# Patient Record
Sex: FEMALE | Race: WHITE | NOT HISPANIC OR LATINO | Employment: FULL TIME | URBAN - METROPOLITAN AREA
[De-identification: names, ages, dates, MRNs, and addresses within clinical notes are randomized per-mention and may not be internally consistent; named-entity substitution may affect disease eponyms.]

---

## 2018-01-25 ENCOUNTER — OFFICE VISIT (OUTPATIENT)
Dept: URGENT CARE | Facility: CLINIC | Age: 48
End: 2018-01-25
Payer: COMMERCIAL

## 2018-01-25 VITALS
TEMPERATURE: 97.9 F | WEIGHT: 242 LBS | HEIGHT: 68 IN | SYSTOLIC BLOOD PRESSURE: 118 MMHG | BODY MASS INDEX: 36.68 KG/M2 | HEART RATE: 88 BPM | DIASTOLIC BLOOD PRESSURE: 76 MMHG | OXYGEN SATURATION: 98 % | RESPIRATION RATE: 16 BRPM

## 2018-01-25 DIAGNOSIS — L30.1 DYSHIDROTIC ECZEMA: ICD-10-CM

## 2018-01-25 PROCEDURE — 99214 OFFICE O/P EST MOD 30 MIN: CPT | Performed by: PHYSICIAN ASSISTANT

## 2018-01-25 RX ORDER — METHYLPREDNISOLONE 4 MG/1
TABLET ORAL
Qty: 21 TAB | Refills: 0 | Status: SHIPPED | OUTPATIENT
Start: 2018-01-25 | End: 2018-11-16

## 2018-01-26 NOTE — PROGRESS NOTES
Chief Complaint   Patient presents with   • Rash       HISTORY OF PRESENT ILLNESS: Patient is a 47 y.o. female who presents today for the following:    Rash on feet x 2 weeks  Worsening  Severe itching; pain with ambulation  OTC steroid cream is not helping  No h/o the same     There are no active problems to display for this patient.      Allergies:E-mycin [erythromycin] and Pcn [penicillins]    Current Outpatient Prescriptions Ordered in [facility]   Medication Sig Dispense Refill   • MethylPREDNISolone (MEDROL DOSEPAK) 4 MG Tablet Therapy Pack Use as package directs 21 Tab 0   • doxycycline (VIBRAMYCIN) 100 MG Cap Take 1 Cap by mouth 2 times a day. 20 Cap 0   • promethazine-codeine (PHENERGAN-CODEINE) 6.25-10 MG/5ML Syrup Take 5 mL by mouth 4 times a day as needed for Cough. 120 mL 0   • albuterol (VENTOLIN OR PROVENTIL) 108 (90 BASE) MCG/ACT Aero Soln inhalation aerosol Inhale 2 Puffs by mouth every four hours as needed for Shortness of Breath. 1 Inhaler 1     No current Epic-ordered facility-administered medications on file.        Past Medical History:   Diagnosis Date   • Arthritis        Social History   Substance Use Topics   • Smoking status: Never Smoker   • Smokeless tobacco: Never Used   • Alcohol use No       Family Status   Relation Status   • Maternal Aunt      Family History   Problem Relation Age of Onset   • Cancer Maternal Aunt      breast       ROS:    Review of Systems   Constitutional: Negative for fever, chills, weight loss and malaise/fatigue.   HENT: Negative for ear pain, nosebleeds, congestion, sore throat and neck pain.    Eyes: Negative for blurred vision.   Respiratory: Negative for cough, sputum production, shortness of breath and wheezing.    Cardiovascular: Negative for chest pain, palpitations, orthopnea and leg swelling.   Gastrointestinal: Negative for heartburn, nausea, vomiting and abdominal pain.   Genitourinary: Negative for dysuria, urgency and frequency.       Exam:  Blood  "pressure 118/76, pulse 88, temperature 36.6 °C (97.9 °F), resp. rate 16, height 1.727 m (5' 8\"), weight 109.8 kg (242 lb), SpO2 98 %.  General: Well developed, well nourished. No distress.  HEENT: Head is grossly normal.  Pulmonary: No respiratory distress noted.  Cardiovascular: Pedal pulses are strong and equal bilaterally.  Skin: Scatter intradermal vesicular lesions noted on the soles of both feet; right is significantly worse than the left. Right sole with 3 separate patches of confluent vesicular lesions. NTTP and without drainage. No erythema or warmth.  Psych: Normal mood. Alert and oriented x3. Judgment and insight is normal.    Assessment/Plan:  Discussed dx and provided handout with information. Take all medication as directed. Discussed wound care at home. Follow up for any signs of infection as discussed in clinic.   1. Dyshidrotic eczema  MethylPREDNISolone (MEDROL DOSEPAK) 4 MG Tablet Therapy Pack       "

## 2018-01-26 NOTE — PATIENT INSTRUCTIONS
Hand Dermatitis  Hand dermatitis (dyshidrotic eczema) is a skin condition in which small, itchy, raised dots or fluid-filled blisters form over the palms of the hands. Outbreaks of hand dermatitis can last 3 to 4 weeks.  CAUSES   The cause of hand dermatitis is unknown. However, it occurs most often in patients with a history of allergies such as:  · Hay fever.  · Allergic asthma.  · Allergies to latex.  Chemical exposure, injuries, and environmental irritants can make hand dermatitis worse. Washing your hands too frequently can remove natural oils, which can dry out the skin and contribute to outbreaks of hand dermatitis.  SYMPTOMS   The most common symptom of hand dermatitis is intense itching. Cracks or grooves (fissures) on the fingers can also develop. Affected areas can be painful, especially areas where large blisters have formed.  DIAGNOSIS  Your caregiver can usually tell what the problem is by doing a physical exam.  PREVENTION  · Avoid excessive hand washing.  · Avoid the use of harsh chemicals.  · Wear protective gloves when handling products that can irritate your skin.  TREATMENT   Steroid creams and ointments, such as over-the-counter 1% hydrocortisone cream, can reduce inflammation and improve moisture retention. These should be applied at least 2 to 4 times per day. Your caregiver may ask you to use a stronger prescription steroid cream to help speed the healing of blistered and cracked skin. In severe cases, oral steroid medicine may be needed. If you have an infection, antibiotics may be needed. Your caregiver may also prescribe antihistamines. These medicines help reduce itching.  HOME CARE INSTRUCTIONS  · Only take over-the-counter or prescription medicines as directed by your caregiver.  · You may use wet or cold compresses. This can help:  ¨ Alleviate itching.  ¨ Increase the effectiveness of topical creams.  ¨ Minimize blisters.  SEEK MEDICAL CARE IF:  · The rash is not better after 1 week of  treatment.  · Signs of infection develop, such as redness, tenderness, or yellowish-white fluid (pus).  · The rash is spreading.     This information is not intended to replace advice given to you by your health care provider. Make sure you discuss any questions you have with your health care provider.     Document Released: 12/18/2006 Document Revised: 03/11/2013 Document Reviewed: 05/16/2012  [employer] Patient Education ©2016 [employer]

## 2018-11-10 ENCOUNTER — OFFICE VISIT (OUTPATIENT)
Dept: URGENT CARE | Facility: PHYSICIAN GROUP | Age: 48
End: 2018-11-10
Payer: COMMERCIAL

## 2018-11-10 ENCOUNTER — HOSPITAL ENCOUNTER (OUTPATIENT)
Dept: RADIOLOGY | Facility: MEDICAL CENTER | Age: 48
End: 2018-11-10
Attending: FAMILY MEDICINE
Payer: COMMERCIAL

## 2018-11-10 VITALS
BODY MASS INDEX: 35.61 KG/M2 | TEMPERATURE: 98.5 F | HEART RATE: 79 BPM | OXYGEN SATURATION: 96 % | SYSTOLIC BLOOD PRESSURE: 122 MMHG | RESPIRATION RATE: 14 BRPM | WEIGHT: 235 LBS | HEIGHT: 68 IN | DIASTOLIC BLOOD PRESSURE: 80 MMHG

## 2018-11-10 DIAGNOSIS — M79.672 LEFT FOOT PAIN: ICD-10-CM

## 2018-11-10 PROCEDURE — 73630 X-RAY EXAM OF FOOT: CPT | Mod: LT

## 2018-11-10 PROCEDURE — 99214 OFFICE O/P EST MOD 30 MIN: CPT | Performed by: INTERNAL MEDICINE

## 2018-11-10 RX ORDER — NAPROXEN 250 MG/1
250 TABLET ORAL 2 TIMES DAILY WITH MEALS
COMMUNITY
End: 2021-01-29

## 2018-11-10 ASSESSMENT — ENCOUNTER SYMPTOMS
CONSTITUTIONAL NEGATIVE: 1
NEUROLOGICAL NEGATIVE: 1

## 2018-11-10 NOTE — PROGRESS NOTES
"Subjective:      Monalisa Rodriguez is a 48 y.o. female who presents with Foot Pain (x monday notice a knot on L foot and two days after pain started occuring// x 4 months heel pain  )            This is a new problem. Pian in the left lateral aspect of the foot for the past, no injures, no paresthesias, naproxen helps a bit,  no hx of gout.  Pain is almost constant, worse with walking.  She denies any fever or chills.  Hx of fx of 5th metatarsal in the past when she was 9 yo.          Review of Systems   Constitutional: Negative.    Neurological: Negative.           Objective:     /80 (BP Location: Left arm, Patient Position: Sitting)   Pulse 79   Temp 36.9 °C (98.5 °F) (Temporal)   Resp 14   Ht 1.727 m (5' 8\")   Wt 106.6 kg (235 lb)   SpO2 96%   BMI 35.73 kg/m²      Physical Exam   Constitutional: She is oriented to person, place, and time. She appears well-nourished. No distress.   Cardiovascular: Normal rate.    Pulmonary/Chest: Effort normal. No respiratory distress.   Musculoskeletal:        Left ankle: Normal.        Left foot: There is tenderness (over the outlined region with some swelling but no erythema noted), bony tenderness and swelling. There is normal range of motion, normal capillary refill, no crepitus and no laceration.        Feet:    Neurological: She is alert and oriented to person, place, and time.   Skin: Skin is warm. No rash noted. She is not diaphoretic. No pallor.     Xray left foot:  Negative  For acute abnormalities         ASSESSMENT:PLAN:  1. Left foot pain  - DX-FOOT-COMPLETE 3+ LEFT; Future  - Vasopneumatic Foot Boots    No fracture seen on x-ray and discussed  No signs of infection  We also discussed and reassured that this would likely not be blood clot  Trial of over-the-counter medication, icing frequently and boot  Plan per orders and instructions  Warning signs reviewed  Case and results reviewed and agree with treatment plan as outlined.  Dr. Becerra    "

## 2018-11-16 ENCOUNTER — HOSPITAL ENCOUNTER (EMERGENCY)
Facility: MEDICAL CENTER | Age: 48
End: 2018-11-17
Attending: EMERGENCY MEDICINE
Payer: COMMERCIAL

## 2018-11-16 DIAGNOSIS — M79.672 LEFT FOOT PAIN: ICD-10-CM

## 2018-11-16 PROCEDURE — 99284 EMERGENCY DEPT VISIT MOD MDM: CPT

## 2018-11-16 ASSESSMENT — PAIN DESCRIPTION - DESCRIPTORS: DESCRIPTORS: SHARP

## 2018-11-17 ENCOUNTER — APPOINTMENT (OUTPATIENT)
Dept: RADIOLOGY | Facility: MEDICAL CENTER | Age: 48
End: 2018-11-17
Attending: EMERGENCY MEDICINE
Payer: COMMERCIAL

## 2018-11-17 VITALS
SYSTOLIC BLOOD PRESSURE: 152 MMHG | OXYGEN SATURATION: 98 % | WEIGHT: 235 LBS | RESPIRATION RATE: 18 BRPM | HEART RATE: 84 BPM | TEMPERATURE: 98 F | DIASTOLIC BLOOD PRESSURE: 101 MMHG | BODY MASS INDEX: 35.73 KG/M2

## 2018-11-17 PROCEDURE — 73700 CT LOWER EXTREMITY W/O DYE: CPT | Mod: LT

## 2018-11-17 PROCEDURE — A9270 NON-COVERED ITEM OR SERVICE: HCPCS | Performed by: EMERGENCY MEDICINE

## 2018-11-17 PROCEDURE — 700102 HCHG RX REV CODE 250 W/ 637 OVERRIDE(OP): Performed by: EMERGENCY MEDICINE

## 2018-11-17 RX ORDER — IBUPROFEN 600 MG/1
600 TABLET ORAL ONCE
Status: COMPLETED | OUTPATIENT
Start: 2018-11-17 | End: 2018-11-17

## 2018-11-17 RX ORDER — KETOROLAC TROMETHAMINE 30 MG/ML
30 INJECTION, SOLUTION INTRAMUSCULAR; INTRAVENOUS ONCE
Status: DISCONTINUED | OUTPATIENT
Start: 2018-11-17 | End: 2018-11-17

## 2018-11-17 RX ORDER — NAPROXEN 500 MG/1
500 TABLET ORAL 2 TIMES DAILY WITH MEALS
Qty: 60 TAB | Refills: 0 | Status: SHIPPED | OUTPATIENT
Start: 2018-11-17 | End: 2021-01-29

## 2018-11-17 RX ADMIN — IBUPROFEN 600 MG: 600 TABLET, FILM COATED ORAL at 01:51

## 2018-11-17 NOTE — ED PROVIDER NOTES
"ED Provider Note    Scribed for Federica Dobson M.D. by Lucy Espinoza. 11/17/2018  12:14 AM    Means of arrival: walk in  History obtained from: patient  History limited by: none      CHIEF COMPLAINT  Chief Complaint   Patient presents with   • Foot Pain     Pt reports she cannot bear weight without pain, for the last few weeks the pain has increased. No obvious swelling or deformity.        HPI  Monalisaamor Rodriguez is a 48 y.o. Female with history of arthritis who presents to the Emergency Department for evaluation of left foot pain onset a few weeks ago with increasing severity tonight. She states that she can not bear any weight on her foot. She explains that there is a visible, painful \"bump\" on the side of her left foot. Patient denies radiation of the pain up her leg. She denies any swelling or redness of her foot. Patient additionally denies associated lower extremity pain or swelling. She went to urgent care and was sent home with a walking boot. Patient denies any recent traumas or past surgeries on her foot. Patient normally takes naproxen for her history of arthritis but has not taken any medications for her foot pain.    REVIEW OF SYSTEMS  Pertinent positive include foot pain. Pertinent negative include foot swelling, leg swelling, warmth.      PAST MEDICAL HISTORY   has a past medical history of Arthritis.    SOCIAL HISTORY  Social History     Social History Main Topics   • Smoking status: Never Smoker   • Smokeless tobacco: Never Used   • Alcohol use No   • Drug use: No            SURGICAL HISTORY   has a past surgical history that includes appendectomy; tonsillectomy; and other orthopedic surgery.    CURRENT MEDICATIONS  Home Medications     Reviewed by Courtney Dobbs R.N. (Registered Nurse) on 11/16/18 at 2240  Med List Status: Not Addressed   Medication Last Dose Status   naproxen (NAPROSYN) 250 MG Tab PRN Active                ALLERGIES  Allergies   Allergen Reactions   • E-Mycin " [Erythromycin]      Stomach ache     • Pcn [Penicillins] Vomiting       PHYSICAL EXAM   VITAL SIGNS: /101   Pulse 82   Temp 36.7 °C (98 °F) (Temporal)   Resp 18   Wt 106.6 kg (235 lb)   SpO2 97%   BMI 35.73 kg/m²    Constitutional: Well appearing middle aged woman, Alert in no apparent distress.  HENT: Normocephalic, Atraumatic. Bilateral external ears normal. Nose normal.  Moist mucous membranes.  Oropharynx clear.  Eyes: Pupils are equal and reactive. Conjunctiva normal.   Neck: Supple, full range of motion  Musculoskeletal: Atraumatic. No obvious deformities noted.  No lower extremity edema. Left foot is atraumatic, no overlying erythema or warmth, full range of motion of left ankle joint without significant pain, tenderness overlying plantar aspect of foot, intact distal pulses  Skin: Warm, Dry.  No erythema, No rash.   Neurologic: Alert and oriented x3. Moving all extremities spontaneously without focal deficits.  Psychiatric: Affect normal, Mood normal, Appears appropriate and not intoxicated.      DIAGNOSTIC STUDIES      RADIOLOGY  Personally reviewed by me  CT-FOOT W/O PLUS RECONS LEFT   Final Result      No bony abnormality.          ED COURSE  Vitals:    11/17/18 0030 11/17/18 0100 11/17/18 0130 11/17/18 0208   BP:       Pulse: 74 77 86 84   Resp:       Temp:       TempSrc:       SpO2: 98% 100% 98% 98%   Weight:             Medications administered:  Medications   ibuprofen (MOTRIN) tablet 600 mg (600 mg Oral Given 11/17/18 0151)         Old records personally reviewed:  Patient was seen at urgent care for similar complaints. She had an X-ray on 11/10 that demonstrated a calcaneal spur but no other acute findings.    12:14 AM Patient seen and examined at bedside. The patient presents with foot pain onset a few weeks ago. Ordered for CT-foot to evaluate. Patient will be treated with 600mg of ibuprofen for her symptoms. Explained that exam is not concerning for  DVT, gout, or infection of her  foot at this time. Informed that her symptoms may be consistent with plantar fascitis or tendinitis.  Explained that she will need to stay off of her feet if her symptoms are tendon related.       MEDICAL DECISION MAKING  Patient presents with 3 week history of atraumatic left foot pain.  She is mildly hypertensive with otherwise normal vitals and well appearing.  Foot exam appears normal without e/o swelling, overlying erythema or warmth.  No clinical signs of DVT, septic arthritis, or gout.  Reviewed recent XR with e/o calcaneal bone spur however otherwise normal.  Pain appears to localized to midfoot and plantar aspect therefore suspect tendonitis or plantar fasciitis.  CT of the foot was completed without e/o occult fracture or significant joint effusion.  Discussed importance of rest without significant weight bearing in addition to daily antiinflammatories.   Plan for follow up with PCP or foot/ankle specialist.  She understands return precautions for changing or worsening symptoms.      DISPOSITION:  Patient will be discharged home in stable condition.    FOLLOW UP:  Mathew Santiago M.D.  [address] [facility] [id number] [phone]      FOOT/ANKLE FOLLOW UP    [facility], Emergency Dept  [facility] [facility] [id number] [phone]    If symptoms worsen      OUTPATIENT MEDICATIONS:  Discharge Medication List as of 11/17/2018  2:34 AM      START taking these medications    Details   !! naproxen (NAPROSYN) 500 MG Tab Take 1 Tab by mouth 2 times a day, with meals., Disp-60 Tab, R-0, Print Rx Paper       !! - Potential duplicate medications found. Please discuss with provider.            IMPRESSION  (M79.672) Left foot pain    Results, diagnoses, and treatment options were discussed with the patient and/or family. Patient verbalized understanding of plan of care.       Lcuy MALIK (Scribe), am scribing for, and in the presence of, Federica Dobson,  M.D..    Electronically signed by: Lucy Espinoza (Scribe), 11/17/2018    IFederica M.D. personally performed the services described in this documentation, as scribed by Lucy Espinoza in my presence, and it is both accurate and complete. E    The note accurately reflects work and decisions made by me.  Federica Dobson  11/17/2018  2:59 PM

## 2018-11-17 NOTE — ED TRIAGE NOTES
Monalisa Patrick Michael  48 y.o. female  Chief Complaint   Patient presents with   • Foot Pain     Pt reports she cannot bear weight without pain, for the last few weeks the pain has increased. No obvious swelling or deformity.        Pt to triage via WC. Pt is alert and oriented, speaking in full sentences, follows commands and responds appropriately to questions. NAD. Resp are even and unlabored.  Pt placed in lobby. Pt educated on triage process. Pt encouraged to alert staff for any changes.

## 2018-11-17 NOTE — DISCHARGE INSTRUCTIONS
You were seen in the Emergency Department for foot pain.    CT foot were completed without significant acute abnormalities.    Please use naproxen twice daily for the next 1-2 weeks.  Stay off your foot, elevate and ice as much as possible.    Please follow up with your primary care physician or foot/ankle specialist in the next week.    Return to the Emergency Department with worsening pain, numbness, weakness, or other concerns.

## 2018-11-26 ENCOUNTER — OFFICE VISIT (OUTPATIENT)
Dept: URGENT CARE | Facility: PHYSICIAN GROUP | Age: 48
End: 2018-11-26
Payer: COMMERCIAL

## 2018-11-26 VITALS
HEART RATE: 78 BPM | RESPIRATION RATE: 20 BRPM | DIASTOLIC BLOOD PRESSURE: 82 MMHG | SYSTOLIC BLOOD PRESSURE: 132 MMHG | OXYGEN SATURATION: 98 % | TEMPERATURE: 96.6 F

## 2018-11-26 DIAGNOSIS — J02.9 SORE THROAT: ICD-10-CM

## 2018-11-26 DIAGNOSIS — J06.9 UPPER RESPIRATORY TRACT INFECTION, UNSPECIFIED TYPE: Primary | ICD-10-CM

## 2018-11-26 LAB
INT CON NEG: NEGATIVE
INT CON POS: POSITIVE
S PYO AG THROAT QL: NEGATIVE

## 2018-11-26 PROCEDURE — 87880 STREP A ASSAY W/OPTIC: CPT | Performed by: NURSE PRACTITIONER

## 2018-11-26 PROCEDURE — 99214 OFFICE O/P EST MOD 30 MIN: CPT | Performed by: NURSE PRACTITIONER

## 2018-11-26 RX ORDER — DOXYCYCLINE HYCLATE 100 MG
100 TABLET ORAL 2 TIMES DAILY
Qty: 20 TAB | Refills: 0 | Status: SHIPPED | OUTPATIENT
Start: 2018-11-26 | End: 2018-12-06

## 2018-11-26 ASSESSMENT — ENCOUNTER SYMPTOMS
NEUROLOGICAL NEGATIVE: 1
GASTROINTESTINAL NEGATIVE: 1
PSYCHIATRIC NEGATIVE: 1
HEADACHES: 0
ABDOMINAL PAIN: 0
SINUS PAIN: 0
SORE THROAT: 1
CARDIOVASCULAR NEGATIVE: 1
BLURRED VISION: 0
EYES NEGATIVE: 1
COUGH: 1
SHORTNESS OF BREATH: 0
CHILLS: 1
MUSCULOSKELETAL NEGATIVE: 1

## 2018-11-27 NOTE — PROGRESS NOTES
Subjective:   Monalisa Rodriguez is a 48 y.o. female who presents for Pharyngitis (x4 days, cough, hard to breath when walking at long intervals)     Pharyngitis    This is a new problem. Episode onset: 4 days ago. The problem has been gradually worsening. Maximum temperature: reports feeling chills. Associated symptoms include congestion and coughing. Pertinent negatives include no abdominal pain, ear pain, headaches or shortness of breath. Associated symptoms comments: +fatigue/malaise. Treatments tried: Eli Myles.     PMH:  has a past medical history of Arthritis. She also has no past medical history of ASTHMA; Asthma; Diabetes; or Type II or unspecified type diabetes mellitus without mention of complication, not stated as uncontrolled.    MEDS:   Current Outpatient Prescriptions:   •  doxycycline (VIBRAMYCIN) 100 MG Tab, Take 1 Tab by mouth 2 times a day for 10 days., Disp: 20 Tab, Rfl: 0  •  naproxen (NAPROSYN) 500 MG Tab, Take 1 Tab by mouth 2 times a day, with meals., Disp: 60 Tab, Rfl: 0  •  naproxen (NAPROSYN) 250 MG Tab, Take 250 mg by mouth 2 times a day, with meals., Disp: , Rfl:     ALLERGIES:   Allergies   Allergen Reactions   • E-Mycin [Erythromycin]      Stomach ache     • Pcn [Penicillins] Vomiting     SURGHX:   Past Surgical History:   Procedure Laterality Date   • APPENDECTOMY     • OTHER ORTHOPEDIC SURGERY      l. knee   • TONSILLECTOMY       SOCHX:  reports that she has never smoked. She has never used smokeless tobacco. She reports that she does not drink alcohol or use drugs.    FH: family history includes Cancer in her maternal aunt.    Review of Systems   Constitutional: Positive for chills and malaise/fatigue.   HENT: Positive for congestion and sore throat. Negative for ear pain and sinus pain.    Eyes: Negative.  Negative for blurred vision.   Respiratory: Positive for cough. Negative for shortness of breath.    Cardiovascular: Negative.  Negative for chest pain.   Gastrointestinal:  Negative.  Negative for abdominal pain.   Genitourinary: Negative.    Musculoskeletal: Negative.    Skin: Negative.    Neurological: Negative.  Negative for headaches.   Psychiatric/Behavioral: Negative.    All other systems reviewed and are negative.     Objective:   /82   Pulse 78   Temp 35.9 °C (96.6 °F)   Resp 20   SpO2 98%      Physical Exam   Constitutional: She is oriented to person, place, and time. She appears well-developed and well-nourished. No distress.   HENT:   Head: Normocephalic and atraumatic.   Right Ear: Tympanic membrane normal.   Left Ear: Tympanic membrane normal.   Nose: Mucosal edema present. No rhinorrhea. Right sinus exhibits no maxillary sinus tenderness and no frontal sinus tenderness. Left sinus exhibits no maxillary sinus tenderness and no frontal sinus tenderness.   Mouth/Throat: Uvula is midline and mucous membranes are normal. Posterior oropharyngeal erythema present. No oropharyngeal exudate or posterior oropharyngeal edema. Tonsils are 1+ on the right. Tonsils are 1+ on the left.   Eyes: Pupils are equal, round, and reactive to light. Conjunctivae and EOM are normal. Right eye exhibits no discharge. Left eye exhibits no discharge.   Neck: Normal range of motion.   Cardiovascular: Normal rate, regular rhythm, normal heart sounds and intact distal pulses.    Pulmonary/Chest: Effort normal and breath sounds normal. No respiratory distress. She has no wheezes.   Coarse breath sounds   Abdominal: Bowel sounds are normal.   Musculoskeletal: Normal range of motion.   Lymphadenopathy:     She has no cervical adenopathy.   Neurological: She is alert and oriented to person, place, and time. No sensory deficit.   Skin: Skin is warm and dry. Capillary refill takes less than 2 seconds.   Psychiatric: She has a normal mood and affect.   Vitals reviewed.       Assessment/Plan:   1. Upper respiratory tract infection, unspecified type  - doxycycline (VIBRAMYCIN) 100 MG Tab; Take 1 Tab by  mouth 2 times a day for 10 days.  Dispense: 20 Tab; Refill: 0    Discussed supportive measures and OTC symptom treatment strategies.    Return for 1) Symptoms that change or worsen, or go to the ER, 2) Follow up with primary care.    Differential diagnosis, natural history, supportive care, and indications for immediate follow-up discussed. All questions answered. Patient agrees with the plan of care.    I have reviewed and agree with history, assessment and plan for office encounter on 11/26/2018 with midlevel provider: Lashaun. Suggested changes to plan or follow-up: none Herman Cespedes M.D.

## 2018-11-27 NOTE — PATIENT INSTRUCTIONS
"Upper Respiratory Infection, Adult  Most upper respiratory infections (URIs) are a viral infection of the air passages leading to the lungs. A URI affects the nose, throat, and upper air passages. The most common type of URI is nasopharyngitis and is typically referred to as \"the common cold.\"  URIs run their course and usually go away on their own. Most of the time, a URI does not require medical attention, but sometimes a bacterial infection in the upper airways can follow a viral infection. This is called a secondary infection. Sinus and middle ear infections are common types of secondary upper respiratory infections.  Bacterial pneumonia can also complicate a URI. A URI can worsen asthma and chronic obstructive pulmonary disease (COPD). Sometimes, these complications can require emergency medical care and may be life threatening.  What are the causes?  Almost all URIs are caused by viruses. A virus is a type of germ and can spread from one person to another.  What increases the risk?  You may be at risk for a URI if:  · You smoke.  · You have chronic heart or lung disease.  · You have a weakened defense (immune) system.  · You are very young or very old.  · You have nasal allergies or asthma.  · You work in crowded or poorly ventilated areas.  · You work in health care facilities or schools.  What are the signs or symptoms?  Symptoms typically develop 2-3 days after you come in contact with a cold virus. Most viral URIs last 7-10 days. However, viral URIs from the influenza virus (flu virus) can last 14-18 days and are typically more severe. Symptoms may include:  · Runny or stuffy (congested) nose.  · Sneezing.  · Cough.  · Sore throat.  · Headache.  · Fatigue.  · Fever.  · Loss of appetite.  · Pain in your forehead, behind your eyes, and over your cheekbones (sinus pain).  · Muscle aches.  How is this diagnosed?  Your health care provider may diagnose a URI by:  · Physical exam.  · Tests to check that your " symptoms are not due to another condition such as:  ¨ Strep throat.  ¨ Sinusitis.  ¨ Pneumonia.  ¨ Asthma.  How is this treated?  A URI goes away on its own with time. It cannot be cured with medicines, but medicines may be prescribed or recommended to relieve symptoms. Medicines may help:  · Reduce your fever.  · Reduce your cough.  · Relieve nasal congestion.  Follow these instructions at home:  · Take medicines only as directed by your health care provider.  · Gargle warm saltwater or take cough drops to comfort your throat as directed by your health care provider.  · Use a warm mist humidifier or inhale steam from a shower to increase air moisture. This may make it easier to breathe.  · Drink enough fluid to keep your urine clear or pale yellow.  · Eat soups and other clear broths and maintain good nutrition.  · Rest as needed.  · Return to work when your temperature has returned to normal or as your health care provider advises. You may need to stay home longer to avoid infecting others. You can also use a face mask and careful hand washing to prevent spread of the virus.  · Increase the usage of your inhaler if you have asthma.  · Do not use any tobacco products, including cigarettes, chewing tobacco, or electronic cigarettes. If you need help quitting, ask your health care provider.  How is this prevented?  The best way to protect yourself from getting a cold is to practice good hygiene.  · Avoid oral or hand contact with people with cold symptoms.  · Wash your hands often if contact occurs.  There is no clear evidence that vitamin C, vitamin E, echinacea, or exercise reduces the chance of developing a cold. However, it is always recommended to get plenty of rest, exercise, and practice good nutrition.  Contact a health care provider if:  · You are getting worse rather than better.  · Your symptoms are not controlled by medicine.  · You have chills.  · You have worsening shortness of breath.  · You have brown  or red mucus.  · You have yellow or brown nasal discharge.  · You have pain in your face, especially when you bend forward.  · You have a fever.  · You have swollen neck glands.  · You have pain while swallowing.  · You have white areas in the back of your throat.  Get help right away if:  · You have severe or persistent:  ¨ Headache.  ¨ Ear pain.  ¨ Sinus pain.  ¨ Chest pain.  · You have chronic lung disease and any of the following:  ¨ Wheezing.  ¨ Prolonged cough.  ¨ Coughing up blood.  ¨ A change in your usual mucus.  · You have a stiff neck.  · You have changes in your:  ¨ Vision.  ¨ Hearing.  ¨ Thinking.  ¨ Mood.  This information is not intended to replace advice given to you by your health care provider. Make sure you discuss any questions you have with your health care provider.  Document Released: 06/13/2002 Document Revised: 08/20/2017 Document Reviewed: 03/25/2015  [employer] Interactive Patient Education © 2017 [employer]

## 2018-12-12 ENCOUNTER — APPOINTMENT (OUTPATIENT)
Dept: RADIOLOGY | Facility: IMAGING CENTER | Age: 48
End: 2018-12-12
Attending: PHYSICIAN ASSISTANT
Payer: COMMERCIAL

## 2018-12-12 ENCOUNTER — OFFICE VISIT (OUTPATIENT)
Dept: URGENT CARE | Facility: CLINIC | Age: 48
End: 2018-12-12
Payer: COMMERCIAL

## 2018-12-12 VITALS
HEIGHT: 68 IN | HEART RATE: 84 BPM | SYSTOLIC BLOOD PRESSURE: 108 MMHG | RESPIRATION RATE: 16 BRPM | DIASTOLIC BLOOD PRESSURE: 72 MMHG | WEIGHT: 235 LBS | BODY MASS INDEX: 35.61 KG/M2 | OXYGEN SATURATION: 94 % | TEMPERATURE: 99.3 F

## 2018-12-12 DIAGNOSIS — R05.3 PERSISTENT COUGH FOR 3 WEEKS OR LONGER: Primary | ICD-10-CM

## 2018-12-12 DIAGNOSIS — R05.9 COUGH: ICD-10-CM

## 2018-12-12 DIAGNOSIS — J98.01 BRONCHOSPASM, ACUTE: ICD-10-CM

## 2018-12-12 DIAGNOSIS — J01.00 ACUTE NON-RECURRENT MAXILLARY SINUSITIS: ICD-10-CM

## 2018-12-12 PROCEDURE — 71046 X-RAY EXAM CHEST 2 VIEWS: CPT | Mod: 26 | Performed by: PHYSICIAN ASSISTANT

## 2018-12-12 PROCEDURE — 99214 OFFICE O/P EST MOD 30 MIN: CPT | Performed by: PHYSICIAN ASSISTANT

## 2018-12-12 RX ORDER — ALBUTEROL SULFATE 90 UG/1
2 AEROSOL, METERED RESPIRATORY (INHALATION) EVERY 4 HOURS PRN
Qty: 1 INHALER | Refills: 0 | Status: SHIPPED | OUTPATIENT
Start: 2018-12-12 | End: 2021-01-29

## 2018-12-12 RX ORDER — METHYLPREDNISOLONE 4 MG/1
TABLET ORAL
Qty: 1 KIT | Refills: 0 | Status: SHIPPED | OUTPATIENT
Start: 2018-12-12 | End: 2021-01-29

## 2018-12-12 RX ORDER — IPRATROPIUM BROMIDE AND ALBUTEROL SULFATE 2.5; .5 MG/3ML; MG/3ML
3 SOLUTION RESPIRATORY (INHALATION) ONCE
Status: COMPLETED | OUTPATIENT
Start: 2018-12-12 | End: 2018-12-12

## 2018-12-12 RX ORDER — CODEINE PHOSPHATE/GUAIFENESIN 10-100MG/5
10 LIQUID (ML) ORAL 4 TIMES DAILY PRN
Qty: 200 ML | Refills: 0 | Status: SHIPPED | OUTPATIENT
Start: 2018-12-12 | End: 2018-12-17

## 2018-12-12 RX ORDER — AZITHROMYCIN 250 MG/1
TABLET, FILM COATED ORAL
Qty: 6 TAB | Refills: 0 | Status: SHIPPED | OUTPATIENT
Start: 2018-12-12 | End: 2021-01-29

## 2018-12-12 RX ADMIN — IPRATROPIUM BROMIDE AND ALBUTEROL SULFATE 3 ML: 2.5; .5 SOLUTION RESPIRATORY (INHALATION) at 12:12

## 2018-12-12 ASSESSMENT — ENCOUNTER SYMPTOMS
VOMITING: 0
RHINORRHEA: 1
WHEEZING: 1
HEADACHES: 1
FEVER: 1
SWOLLEN GLANDS: 0
CHILLS: 0
SORE THROAT: 0
SPUTUM PRODUCTION: 1
COUGH: 1
SHORTNESS OF BREATH: 0
SINUS PAIN: 1
STRIDOR: 0

## 2018-12-12 NOTE — PROGRESS NOTES
Subjective:      Monalisa Rodriguez is a 48 y.o. female who presents with Influenza (Cough / Sinus issues)    PMH:  has a past medical history of Arthritis. She also has no past medical history of ASTHMA; Asthma; Diabetes; or Type II or unspecified type diabetes mellitus without mention of complication, not stated as uncontrolled.  MEDS:   Current Outpatient Prescriptions:   •  naproxen (NAPROSYN) 500 MG Tab, Take 1 Tab by mouth 2 times a day, with meals., Disp: 60 Tab, Rfl: 0  •  naproxen (NAPROSYN) 250 MG Tab, Take 250 mg by mouth 2 times a day, with meals., Disp: , Rfl:   ALLERGIES:   Allergies   Allergen Reactions   • E-Mycin [Erythromycin]      Stomach ache     • Pcn [Penicillins] Vomiting     SURGHX:   Past Surgical History:   Procedure Laterality Date   • APPENDECTOMY     • OTHER ORTHOPEDIC SURGERY      l. knee   • TONSILLECTOMY       SOCHX:  reports that she has never smoked. She has never used smokeless tobacco. She reports that she does not drink alcohol or use drugs.  FH: Reviewed with patient, not pertinent to this visit.            Patient presents to clinic today with a complaint of productive cough, sinus headache, sinus congestion, postnasal drip and wheeze for 3 weeks.  Patient states she was seen in urgent care a few days after her symptoms began, and was given a prescription for doxycycline.  Patient states she did not complete the entire prescription because it was not making her feel any better.  Patient states she started taking some over-the-counter decongestants and some cough and cold medications instead with a little bit of improvement however in the last 3-4 days patient states her symptoms have worsened significantly.  Patient states she is not getting lost any sleep at night secondary to the cough and postnasal drip.  Patient has also developed a low grade fever in the last 24 hours.  Patient denies any other complaints today.        URI    This is a new problem. Episode onset: 3 weeks.  "The problem has been waxing and waning. The maximum temperature recorded prior to her arrival was 100.4 - 100.9 F. The fever has been present for 1 to 2 days. Associated symptoms include congestion, coughing, headaches, a plugged ear sensation, rhinorrhea, sinus pain and wheezing. Pertinent negatives include no chest pain, sneezing, sore throat, swollen glands or vomiting. Treatments tried: Antibiotics, over-the-counter cough cold medications, humidifier. The treatment provided mild relief.       Review of Systems   Constitutional: Positive for fever and malaise/fatigue. Negative for chills.   HENT: Positive for congestion, rhinorrhea and sinus pain. Negative for sneezing and sore throat.    Respiratory: Positive for cough, sputum production and wheezing. Negative for shortness of breath and stridor.    Cardiovascular: Negative for chest pain and leg swelling.   Gastrointestinal: Negative for vomiting.   Neurological: Positive for headaches.   All other systems reviewed and are negative.         Objective:     /72 (BP Location: Right arm, Patient Position: Sitting, BP Cuff Size: Large adult)   Pulse 84   Temp 37.4 °C (99.3 °F) (Temporal)   Resp 16   Ht 1.727 m (5' 8\")   Wt 106.6 kg (235 lb)   SpO2 94%   BMI 35.73 kg/m²      Physical Exam   Constitutional: She is oriented to person, place, and time. She appears well-developed and well-nourished.   HENT:   Head: Normocephalic and atraumatic.   Eyes: Pupils are equal, round, and reactive to light. Conjunctivae and EOM are normal.   Neck: Normal range of motion. Neck supple.   Cardiovascular: Normal rate, regular rhythm and normal heart sounds.    Pulmonary/Chest: Effort normal. No respiratory distress. She has no decreased breath sounds. She has wheezes. She has rhonchi. She has no rales.   Course productive cough   Abdominal: Soft.   Musculoskeletal: Normal range of motion.   Neurological: She is alert and oriented to person, place, and time. Gait normal. "   Skin: Skin is warm and dry. Capillary refill takes less than 2 seconds.   Psychiatric: She has a normal mood and affect.   Nursing note and vitals reviewed.         Pt states symptoms have improved after neb treatment, on re-eval wheezing has improved and air movement better throughout.     Xray images viewed and interpreted by me, confirmed by radiology:    FINDINGS:  The heart is normal in size.  Ill-defined opacities adjacent to the minor fissure.  No pleural effusions are appreciated.     Impression       Atelectasis/possible pneumonitis adjacent to the minor fissure.   Reading Provider Reading Date   Antoine Champagne M.D. Dec 12, 2018   Signing Provider Signing Date Signing Time   Antoine Champagne M.D. Dec 12, 2018 11:09 AM          Assessment/Plan:     1. Persistent cough for 3 weeks or longer  DX-CHEST-2 VIEWS    ipratropium-albuterol (DUONEB) nebulizer solution    MethylPREDNISolone (MEDROL DOSEPAK) 4 MG Tablet Therapy Pack    albuterol 108 (90 Base) MCG/ACT Aero Soln inhalation aerosol    guaifenesin-codeine (TUSSI-ORGANIDIN NR) 100-10 MG/5ML syrup    azithromycin (ZITHROMAX) 250 MG Tab   2. Bronchospasm, acute  DX-CHEST-2 VIEWS    ipratropium-albuterol (DUONEB) nebulizer solution    MethylPREDNISolone (MEDROL DOSEPAK) 4 MG Tablet Therapy Pack    albuterol 108 (90 Base) MCG/ACT Aero Soln inhalation aerosol    guaifenesin-codeine (TUSSI-ORGANIDIN NR) 100-10 MG/5ML syrup    azithromycin (ZITHROMAX) 250 MG Tab   3. Acute non-recurrent maxillary sinusitis  DX-CHEST-2 VIEWS    ipratropium-albuterol (DUONEB) nebulizer solution    MethylPREDNISolone (MEDROL DOSEPAK) 4 MG Tablet Therapy Pack    albuterol 108 (90 Base) MCG/ACT Aero Soln inhalation aerosol    guaifenesin-codeine (TUSSI-ORGANIDIN NR) 100-10 MG/5ML syrup    azithromycin (ZITHROMAX) 250 MG Tab     PT to begin Rx medications today, and can continue OTC medications, increase fluids and rest.     PT instructed not to drive or operate heavy machinery or drink alcohol  while taking this medication because it contains either a narcotic or benzodiazepines which causes drowsiness. PT verbalized understanding of these instructions.     [facility] Aware web site evaluation: I have obtained and reviewed patient utilization report from [facility] pharmacy database prior to writing prescription for controlled substance.  No history of abuse.    PT should follow up with PCP in 1-2 days for re-evaluation if symptoms have not improved.  Discussed red flags and reasons to return to UC or ED.  Pt and/or family verbalized understanding of diagnosis and follow up instructions and was offered informational handout on diagnosis.  PT discharged.

## 2018-12-12 NOTE — LETTER
December 12, 2018         Patient: Monalisa Rodriguez   YOB: 1970   Date of Visit: 12/12/2018           To Whom it May Concern:    Monalisa Rodriguez was seen in my clinic on 12/12/2018. She may return to work on 12/17/2018 or sooner if condition improves sooner.       If you have any questions or concerns, please don't hesitate to call.        Sincerely,           Tameka Crespo P.A.-C.  Electronically Signed

## 2018-12-19 ENCOUNTER — OFFICE VISIT (OUTPATIENT)
Dept: URGENT CARE | Facility: CLINIC | Age: 48
End: 2018-12-19
Payer: COMMERCIAL

## 2018-12-19 ENCOUNTER — APPOINTMENT (OUTPATIENT)
Dept: RADIOLOGY | Facility: IMAGING CENTER | Age: 48
End: 2018-12-19
Attending: PHYSICIAN ASSISTANT
Payer: COMMERCIAL

## 2018-12-19 VITALS
HEIGHT: 68 IN | HEART RATE: 78 BPM | RESPIRATION RATE: 16 BRPM | BODY MASS INDEX: 35.61 KG/M2 | DIASTOLIC BLOOD PRESSURE: 70 MMHG | WEIGHT: 235 LBS | SYSTOLIC BLOOD PRESSURE: 110 MMHG | TEMPERATURE: 98.7 F | OXYGEN SATURATION: 90 %

## 2018-12-19 DIAGNOSIS — R05.9 COUGH: ICD-10-CM

## 2018-12-19 DIAGNOSIS — J22 LRTI (LOWER RESPIRATORY TRACT INFECTION): ICD-10-CM

## 2018-12-19 PROCEDURE — 99214 OFFICE O/P EST MOD 30 MIN: CPT | Performed by: PHYSICIAN ASSISTANT

## 2018-12-19 PROCEDURE — 71046 X-RAY EXAM CHEST 2 VIEWS: CPT | Mod: 26 | Performed by: PHYSICIAN ASSISTANT

## 2018-12-19 RX ORDER — CEFDINIR 300 MG/1
300 CAPSULE ORAL 2 TIMES DAILY
Qty: 10 CAP | Refills: 0 | Status: SHIPPED | OUTPATIENT
Start: 2018-12-19 | End: 2018-12-24

## 2018-12-19 RX ORDER — PREDNISONE 20 MG/1
TABLET ORAL
Qty: 6 TAB | Refills: 0 | Status: SHIPPED | OUTPATIENT
Start: 2018-12-19 | End: 2021-01-29

## 2018-12-19 ASSESSMENT — ENCOUNTER SYMPTOMS
FOCAL WEAKNESS: 0
HEMOPTYSIS: 0
PALPITATIONS: 0
MYALGIAS: 0
SINUS PAIN: 0
CHILLS: 0
ABDOMINAL PAIN: 0
SENSORY CHANGE: 0
SPUTUM PRODUCTION: 0
NAUSEA: 0
SORE THROAT: 0
VOMITING: 0
SHORTNESS OF BREATH: 1
WHEEZING: 1
COUGH: 1
RHINORRHEA: 1
FEVER: 0
TINGLING: 0
HEADACHES: 0

## 2018-12-20 NOTE — PROGRESS NOTES
Subjective:      Monalisa Rodriguez is a 48 y.o. female who presents with Cough (x 1 month not getting better)            Cough   This is a new problem. Episode onset: 4 weeks  The problem has been unchanged. The cough is non-productive. Associated symptoms include rhinorrhea, shortness of breath and wheezing. Pertinent negatives include no chest pain, chills, ear congestion, ear pain, fever, headaches, hemoptysis, myalgias, nasal congestion, postnasal drip, rash or sore throat. Associated symptoms comments: Some back pain and chest pain associated with cough. No chest pain on exertion or at rest. No calf pain bilaterally or calf tenderness. . She has tried a beta-agonist inhaler and oral steroids (7 days of Doxycycline, Z-pack. ) for the symptoms. Her past medical history is significant for pneumonia. There is no history of asthma or bronchitis.     Past Medical History:   Diagnosis Date   • Arthritis        Past Surgical History:   Procedure Laterality Date   • APPENDECTOMY     • OTHER ORTHOPEDIC SURGERY      l. knee   • TONSILLECTOMY         Family History   Problem Relation Age of Onset   • Cancer Maternal Aunt         breast       Allergies   Allergen Reactions   • E-Mycin [Erythromycin]      Stomach ache     • Pcn [Penicillins] Vomiting       Medications, Allergies, and current problem list reviewed today in Epic      Review of Systems   Constitutional: Negative for chills, fever and malaise/fatigue.   HENT: Positive for rhinorrhea. Negative for congestion, ear discharge, ear pain, postnasal drip, sinus pain and sore throat.    Respiratory: Positive for cough, shortness of breath and wheezing. Negative for hemoptysis and sputum production.    Cardiovascular: Negative for chest pain, palpitations and leg swelling.   Gastrointestinal: Negative for abdominal pain, nausea and vomiting.   Musculoskeletal: Negative for myalgias.   Skin: Negative for rash.   Neurological: Negative for tingling, sensory change, focal  "weakness and headaches.     All other systems reviewed and are negative.        Objective:     /70 (BP Location: Left arm, Patient Position: Sitting, BP Cuff Size: Large adult)   Pulse 78   Temp 37.1 °C (98.7 °F) (Temporal)   Resp 16   Ht 1.727 m (5' 8\")   Wt 106.6 kg (235 lb)   SpO2 90%   BMI 35.73 kg/m²      Physical Exam   Constitutional: She is oriented to person, place, and time. She appears well-developed and well-nourished. No distress.   HENT:   Head: Normocephalic and atraumatic.   Right Ear: Tympanic membrane, external ear and ear canal normal.   Left Ear: Tympanic membrane, external ear and ear canal normal.   Nose: Mucosal edema and rhinorrhea present. Right sinus exhibits no maxillary sinus tenderness. Left sinus exhibits no maxillary sinus tenderness.   Mouth/Throat: Uvula is midline, oropharynx is clear and moist and mucous membranes are normal.   Eyes: Conjunctivae are normal.   Neck: Neck supple.   Cardiovascular: Normal rate, regular rhythm and normal heart sounds.  Exam reveals no gallop and no friction rub.    No murmur heard.  Pulmonary/Chest: Effort normal. No respiratory distress. She has no decreased breath sounds. She has wheezes (mild wheeze in right middle lung area). She has no rhonchi. She has no rales.   Musculoskeletal:   Lower extremities without calf TTP, calf swelling or redness bilaterally   Lymphadenopathy:     She has no cervical adenopathy.   Neurological: She is alert and oriented to person, place, and time. No cranial nerve deficit.   Skin: Skin is warm and dry. No rash noted.   Psychiatric: She has a normal mood and affect. Her behavior is normal. Judgment and thought content normal.          12/19/2018 4:26 PM    HISTORY/REASON FOR EXAM:  Cough and congestion    TECHNIQUE/EXAM DESCRIPTION AND NUMBER OF VIEWS:  Two views of the chest.    COMPARISON: None.    FINDINGS:  There is bibasilar atelectasis.  The heart is normal in size.  There is no evidence of pleural " effusion.  Soft tissues and bony structures are unremarkable.     Impression       Bibasilar atelectasis.          Assessment/Plan:   .  1. LRTI (lower respiratory tract infection)  DX-CHEST-2 VIEWS    cefdinir (OMNICEF) 300 MG Cap    predniSONE (DELTASONE) 20 MG Tab         Current Outpatient Prescriptions:   •  cefdinir (OMNICEF) 300 MG Cap, Take 1 Cap by mouth 2 times a day for 5 days., Disp: 10 Cap, Rfl: 0    •  predniSONE (DELTASONE) 20 MG Tab, 2 tabs by mouth daily x 3 days., Disp: 6 Tab, Rfl: 0    Continue Albuterol, fluids, rest, humidification.     Differential diagnoses, Supportive care, and indications for immediate follow-up discussed with patient.   Instructed to return to clinic or nearest emergency department for any change in condition, further concerns, or worsening of symptoms.    The patient demonstrated a good understanding and agreed with the treatment plan.    Mirian Bruno P.A.-C.

## 2019-06-28 ENCOUNTER — HOSPITAL ENCOUNTER (OUTPATIENT)
Dept: RADIOLOGY | Facility: MEDICAL CENTER | Age: 49
End: 2019-06-28
Attending: FAMILY MEDICINE
Payer: COMMERCIAL

## 2019-06-28 DIAGNOSIS — Z12.31 BREAST CANCER SCREENING BY MAMMOGRAM: ICD-10-CM

## 2019-06-28 PROCEDURE — 77063 BREAST TOMOSYNTHESIS BI: CPT

## 2020-01-18 ENCOUNTER — HOSPITAL ENCOUNTER (OUTPATIENT)
Dept: LAB | Facility: MEDICAL CENTER | Age: 50
End: 2020-01-18
Attending: FAMILY MEDICINE
Payer: COMMERCIAL

## 2020-01-18 LAB
25(OH)D3 SERPL-MCNC: 20 NG/ML (ref 30–100)
ALBUMIN SERPL BCP-MCNC: 3.9 G/DL (ref 3.2–4.9)
ALBUMIN/GLOB SERPL: 1.4 G/DL
ALP SERPL-CCNC: 45 U/L (ref 30–99)
ALT SERPL-CCNC: 24 U/L (ref 2–50)
ANION GAP SERPL CALC-SCNC: 8 MMOL/L (ref 0–11.9)
AST SERPL-CCNC: 24 U/L (ref 12–45)
BASOPHILS # BLD AUTO: 0.6 % (ref 0–1.8)
BASOPHILS # BLD: 0.03 K/UL (ref 0–0.12)
BILIRUB SERPL-MCNC: 0.5 MG/DL (ref 0.1–1.5)
BUN SERPL-MCNC: 16 MG/DL (ref 8–22)
CALCIUM SERPL-MCNC: 9.2 MG/DL (ref 8.5–10.5)
CHLORIDE SERPL-SCNC: 106 MMOL/L (ref 96–112)
CHOLEST SERPL-MCNC: 194 MG/DL (ref 100–199)
CO2 SERPL-SCNC: 23 MMOL/L (ref 20–33)
CREAT SERPL-MCNC: 0.77 MG/DL (ref 0.5–1.4)
EOSINOPHIL # BLD AUTO: 0.17 K/UL (ref 0–0.51)
EOSINOPHIL NFR BLD: 3.6 % (ref 0–6.9)
ERYTHROCYTE [DISTWIDTH] IN BLOOD BY AUTOMATED COUNT: 39.7 FL (ref 35.9–50)
GLOBULIN SER CALC-MCNC: 2.8 G/DL (ref 1.9–3.5)
GLUCOSE SERPL-MCNC: 87 MG/DL (ref 65–99)
HCT VFR BLD AUTO: 45.9 % (ref 37–47)
HDLC SERPL-MCNC: 55 MG/DL
HGB BLD-MCNC: 15 G/DL (ref 12–16)
IMM GRANULOCYTES # BLD AUTO: 0.01 K/UL (ref 0–0.11)
IMM GRANULOCYTES NFR BLD AUTO: 0.2 % (ref 0–0.9)
LDLC SERPL CALC-MCNC: 118 MG/DL
LYMPHOCYTES # BLD AUTO: 1.85 K/UL (ref 1–4.8)
LYMPHOCYTES NFR BLD: 39.3 % (ref 22–41)
MCH RBC QN AUTO: 29.9 PG (ref 27–33)
MCHC RBC AUTO-ENTMCNC: 32.7 G/DL (ref 33.6–35)
MCV RBC AUTO: 91.4 FL (ref 81.4–97.8)
MONOCYTES # BLD AUTO: 0.29 K/UL (ref 0–0.85)
MONOCYTES NFR BLD AUTO: 6.2 % (ref 0–13.4)
NEUTROPHILS # BLD AUTO: 2.36 K/UL (ref 2–7.15)
NEUTROPHILS NFR BLD: 50.1 % (ref 44–72)
NRBC # BLD AUTO: 0 K/UL
NRBC BLD-RTO: 0 /100 WBC
PLATELET # BLD AUTO: 211 K/UL (ref 164–446)
PMV BLD AUTO: 11.8 FL (ref 9–12.9)
POTASSIUM SERPL-SCNC: 4.2 MMOL/L (ref 3.6–5.5)
PROT SERPL-MCNC: 6.7 G/DL (ref 6–8.2)
RBC # BLD AUTO: 5.02 M/UL (ref 4.2–5.4)
SODIUM SERPL-SCNC: 137 MMOL/L (ref 135–145)
T3FREE SERPL-MCNC: 3.27 PG/ML (ref 2.4–4.2)
T4 FREE SERPL-MCNC: 0.78 NG/DL (ref 0.53–1.43)
TRIGL SERPL-MCNC: 106 MG/DL (ref 0–149)
TSH SERPL DL<=0.005 MIU/L-ACNC: 3.02 UIU/ML (ref 0.38–5.33)
WBC # BLD AUTO: 4.7 K/UL (ref 4.8–10.8)

## 2020-01-18 PROCEDURE — 80061 LIPID PANEL: CPT

## 2020-01-18 PROCEDURE — 85025 COMPLETE CBC W/AUTO DIFF WBC: CPT

## 2020-01-18 PROCEDURE — 36415 COLL VENOUS BLD VENIPUNCTURE: CPT

## 2020-01-18 PROCEDURE — 84439 ASSAY OF FREE THYROXINE: CPT

## 2020-01-18 PROCEDURE — 84443 ASSAY THYROID STIM HORMONE: CPT

## 2020-01-18 PROCEDURE — 84481 FREE ASSAY (FT-3): CPT

## 2020-01-18 PROCEDURE — 80053 COMPREHEN METABOLIC PANEL: CPT

## 2020-01-18 PROCEDURE — 82306 VITAMIN D 25 HYDROXY: CPT

## 2020-01-18 PROCEDURE — 86800 THYROGLOBULIN ANTIBODY: CPT

## 2020-01-20 LAB — THYROGLOB AB SERPL-ACNC: <0.9 IU/ML (ref 0–4)

## 2021-01-29 ENCOUNTER — OFFICE VISIT (OUTPATIENT)
Dept: URGENT CARE | Facility: PHYSICIAN GROUP | Age: 51
End: 2021-01-29

## 2021-01-29 VITALS
TEMPERATURE: 98.1 F | SYSTOLIC BLOOD PRESSURE: 132 MMHG | WEIGHT: 236 LBS | DIASTOLIC BLOOD PRESSURE: 80 MMHG | OXYGEN SATURATION: 96 % | BODY MASS INDEX: 35.77 KG/M2 | RESPIRATION RATE: 16 BRPM | HEIGHT: 68 IN | HEART RATE: 74 BPM

## 2021-01-29 DIAGNOSIS — B00.89 HERPETIC WHITLOW: ICD-10-CM

## 2021-01-29 PROCEDURE — 99213 OFFICE O/P EST LOW 20 MIN: CPT | Performed by: FAMILY MEDICINE

## 2021-01-29 RX ORDER — VALACYCLOVIR HYDROCHLORIDE 1 G/1
1000 TABLET, FILM COATED ORAL 3 TIMES DAILY
Qty: 21 TAB | Refills: 0 | Status: SHIPPED | OUTPATIENT
Start: 2021-01-29 | End: 2021-02-05

## 2021-01-29 ASSESSMENT — ENCOUNTER SYMPTOMS
COUGH: 0
VOMITING: 0
FEVER: 0
SORE THROAT: 0

## 2021-01-29 ASSESSMENT — FIBROSIS 4 INDEX: FIB4 SCORE: 1.18

## 2021-01-29 NOTE — PROGRESS NOTES
"Subjective:     Monalisa Rodriguez is a 50 y.o. female who presents for Rash (rash between ring finger and middle finger( 4x days) )    HPI  Pt presents for evaluation of rash between third and fourth digits on left hand  Rash looks like little bumps  Has some surrounding redness in the area  Has a constant pain in the area of rash  Rash is sensitive to touch  Also has a small patch of a similar rash over her left neck  Does not feel ill otherwise and has had no fevers  No recent steroid use  No sick contacts with similar symptoms    Review of Systems   Constitutional: Negative for fever.   HENT: Negative for sore throat.    Respiratory: Negative for cough.    Gastrointestinal: Negative for vomiting.   Skin: Positive for rash.     PMH:  has a past medical history of Arthritis. She also has no past medical history of ASTHMA, Asthma, Diabetes, or Type II or unspecified type diabetes mellitus without mention of complication, not stated as uncontrolled.  MEDS:   Current Outpatient Medications:   •  valacyclovir (VALTREX) 1 GM Tab, Take 1 Tab by mouth 3 times a day for 7 days., Disp: 21 Tab, Rfl: 0  ALLERGIES:   Allergies   Allergen Reactions   • E-Mycin [Erythromycin]      Stomach ache     • Pcn [Penicillins] Vomiting     SURGHX:   Past Surgical History:   Procedure Laterality Date   • APPENDECTOMY     • OTHER ORTHOPEDIC SURGERY      l. knee   • TONSILLECTOMY       SOCHX:  reports that she has never smoked. She has never used smokeless tobacco. She reports that she does not drink alcohol or use drugs.     Objective:   /80 (BP Location: Left arm, Patient Position: Sitting, BP Cuff Size: Adult)   Pulse 74   Temp 36.7 °C (98.1 °F) (Temporal)   Resp 16   Ht 1.727 m (5' 8\")   Wt 107 kg (236 lb)   SpO2 96%   BMI 35.88 kg/m²     Physical Exam  Constitutional:       General: She is not in acute distress.     Appearance: She is well-developed. She is not diaphoretic.   HENT:      Head: Normocephalic and atraumatic. "   Pulmonary:      Effort: Pulmonary effort is normal.   Skin:     General: Skin is warm and dry.      Comments: Approximately 1 cm x 1 cm patch of vesicular rash with mild erythema surrounding base of multiple vesicles between third/fourth fingers of left hand.  No open lesions, no tracking erythema into hand  Similar-appearing vesicular rash with erythematous base over left side of neck which is approximately 1.5 cm x 1.5 cm   Neurological:      Mental Status: She is alert and oriented to person, place, and time.   Psychiatric:         Behavior: Behavior normal.         Thought Content: Thought content normal.         Judgment: Judgment normal.       Assessment/Plan:   Assessment    1. Herpetic kezia  - valacyclovir (VALTREX) 1 GM Tab; Take 1 Tab by mouth 3 times a day for 7 days.  Dispense: 21 Tab; Refill: 0    Patient with a rash which appears to be herpetic kezia.  Advised that this also could be atypical shingles due to her small patch present on the left side of neck as well.  Will treat with Valtrex and reviewed other supportive care measures.  She has no signs of secondary infection at this time and advised that antibiotics are not indicated for this.  Reviewed red flags to watch for which could indicate secondary infection and given close follow-up precautions.  If healing well, will follow up as needed.

## 2022-04-29 ENCOUNTER — HOSPITAL ENCOUNTER (OUTPATIENT)
Dept: LAB | Facility: MEDICAL CENTER | Age: 52
End: 2022-04-29
Attending: PHYSICIAN ASSISTANT
Payer: COMMERCIAL

## 2022-04-29 LAB
BASOPHILS # BLD AUTO: 1 % (ref 0–1.8)
BASOPHILS # BLD: 0.05 K/UL (ref 0–0.12)
CRP SERPL HS-MCNC: <0.3 MG/DL (ref 0–0.75)
EOSINOPHIL # BLD AUTO: 0.22 K/UL (ref 0–0.51)
EOSINOPHIL NFR BLD: 4.4 % (ref 0–6.9)
ERYTHROCYTE [DISTWIDTH] IN BLOOD BY AUTOMATED COUNT: 41.1 FL (ref 35.9–50)
ERYTHROCYTE [SEDIMENTATION RATE] IN BLOOD BY WESTERGREN METHOD: 7 MM/HOUR (ref 0–25)
HCT VFR BLD AUTO: 43.2 % (ref 37–47)
HGB BLD-MCNC: 14.7 G/DL (ref 12–16)
IMM GRANULOCYTES # BLD AUTO: 0.01 K/UL (ref 0–0.11)
IMM GRANULOCYTES NFR BLD AUTO: 0.2 % (ref 0–0.9)
LYMPHOCYTES # BLD AUTO: 1.81 K/UL (ref 1–4.8)
LYMPHOCYTES NFR BLD: 36.4 % (ref 22–41)
MCH RBC QN AUTO: 30.2 PG (ref 27–33)
MCHC RBC AUTO-ENTMCNC: 34 G/DL (ref 33.6–35)
MCV RBC AUTO: 88.9 FL (ref 81.4–97.8)
MONOCYTES # BLD AUTO: 0.39 K/UL (ref 0–0.85)
MONOCYTES NFR BLD AUTO: 7.8 % (ref 0–13.4)
NEUTROPHILS # BLD AUTO: 2.49 K/UL (ref 2–7.15)
NEUTROPHILS NFR BLD: 50.2 % (ref 44–72)
NRBC # BLD AUTO: 0 K/UL
NRBC BLD-RTO: 0 /100 WBC
PLATELET # BLD AUTO: 261 K/UL (ref 164–446)
PMV BLD AUTO: 10.9 FL (ref 9–12.9)
PROCALCITONIN SERPL-MCNC: <0.05 NG/ML
RBC # BLD AUTO: 4.86 M/UL (ref 4.2–5.4)
WBC # BLD AUTO: 5 K/UL (ref 4.8–10.8)

## 2022-04-29 PROCEDURE — 86140 C-REACTIVE PROTEIN: CPT

## 2022-04-29 PROCEDURE — 84145 PROCALCITONIN (PCT): CPT

## 2022-04-29 PROCEDURE — 36415 COLL VENOUS BLD VENIPUNCTURE: CPT

## 2022-04-29 PROCEDURE — 85025 COMPLETE CBC W/AUTO DIFF WBC: CPT

## 2022-04-29 PROCEDURE — 85652 RBC SED RATE AUTOMATED: CPT
